# Patient Record
(demographics unavailable — no encounter records)

---

## 2025-06-03 NOTE — DISCUSSION/SUMMARY
[de-identified] : At this time I like to get an MRI of his right hip to rule out a tendon or labral tear.  For now he can weight-bear as tolerated.  I discussed with him to alternate ice and warm compresses.  He is carey continue the ibuprofen and Robaxin as needed for pain.  Will schedule him a follow-up for repeat evaluation, he can call after the MRI to go over the results. Patient will call me if any other problems or concerns.  Patient verbalized understanding and agreed with the plan, all questions were answered in the office today.

## 2025-06-03 NOTE — HISTORY OF PRESENT ILLNESS
[de-identified] : 31-year-old male is here today for evaluation of his right groin.  Patient states yesterday he was at work, he slipped on mud on the floor and his right leg went behind him and he went into a splint.  He felt a tearing sensation in the right groin.  He went to [facility] where he had a CAT scan done.  He states they did it to make sure there was no hernia.  He was told there was not.  He does not have the results with him.  He has been taking ibuprofen and Robaxin with some relief.  He denies any pain on the left side.  He denies any lower abdominal pain.

## 2025-06-03 NOTE — IMAGING
[de-identified] : On examination he has some swelling and ecchymosis in the pubic area.  He is tender to palpation throughout the right groin.  He has a positive logroll.  He is able to straight leg raise.  He has good range of motion of the hip, mild stiffness, he has pain with forward flexion and rotation of the hip.  No tenderness over the greater trochanter.  Has pain with straight leg raise against resistance.  No tenderness to palpation of the right knee, the calf is soft and nontender.  Sensation is intact throughout, no saddle anesthesia.  X-rays taken in the office today of the right hip and pelvis show no obvious fractures, dislocations, or other bony abnormalities.

## 2025-06-24 NOTE — HISTORY OF PRESENT ILLNESS
[de-identified] : right hip pain pain getting into and out of a car  NAD Right hip:  no skin breakdown FF 0-110 ER 40 IR 20 positive impingement ttp hip ttp greater troch NVI comp soft and nt  Xray right hip:  Plan: